# Patient Record
Sex: FEMALE | Race: WHITE | NOT HISPANIC OR LATINO
[De-identification: names, ages, dates, MRNs, and addresses within clinical notes are randomized per-mention and may not be internally consistent; named-entity substitution may affect disease eponyms.]

---

## 2018-06-08 ENCOUNTER — APPOINTMENT (OUTPATIENT)
Dept: PLASTIC SURGERY | Facility: CLINIC | Age: 78
End: 2018-06-08
Payer: SELF-PAY

## 2018-06-08 PROBLEM — Z00.00 ENCOUNTER FOR PREVENTIVE HEALTH EXAMINATION: Status: ACTIVE | Noted: 2018-06-08

## 2018-06-08 PROCEDURE — 11950 SUBQ NJX FILLING MATRL 1CC/<: CPT

## 2018-06-08 RX ORDER — BESIFLOXACIN 6 MG/ML
0.6 SUSPENSION OPHTHALMIC
Qty: 5 | Refills: 0 | Status: ACTIVE | COMMUNITY
Start: 2017-12-20

## 2018-06-08 RX ORDER — DIFLUPREDNATE 0.5 MG/ML
0.05 EMULSION OPHTHALMIC
Qty: 5 | Refills: 0 | Status: ACTIVE | COMMUNITY
Start: 2017-12-20

## 2018-06-08 RX ORDER — CONJUGATED ESTROGENS AND MEDROXYPROGESTERONE ACETATE .625; 2.5 MG/1; MG/1
0.625-2.5 TABLET, SUGAR COATED ORAL
Qty: 28 | Refills: 0 | Status: ACTIVE | COMMUNITY
Start: 2018-05-10

## 2018-06-08 RX ORDER — NEPAFENAC 3 MG/ML
0.3 SUSPENSION/ DROPS OPHTHALMIC
Qty: 17 | Refills: 0 | Status: ACTIVE | COMMUNITY
Start: 2017-12-20

## 2018-08-02 ENCOUNTER — APPOINTMENT (OUTPATIENT)
Dept: PLASTIC SURGERY | Facility: CLINIC | Age: 78
End: 2018-08-02

## 2018-08-15 ENCOUNTER — APPOINTMENT (OUTPATIENT)
Dept: PLASTIC SURGERY | Facility: CLINIC | Age: 78
End: 2018-08-15
Payer: SELF-PAY

## 2018-08-15 PROCEDURE — 11950 SUBQ NJX FILLING MATRL 1CC/<: CPT

## 2019-06-05 ENCOUNTER — APPOINTMENT (OUTPATIENT)
Dept: PLASTIC SURGERY | Facility: CLINIC | Age: 79
End: 2019-06-05
Payer: SELF-PAY

## 2019-06-05 PROCEDURE — 11950 SUBQ NJX FILLING MATRL 1CC/<: CPT

## 2019-06-05 NOTE — PROCEDURE
[FreeTextEntry2] : Restylane lift injection, 1 syringe  [FreeTextEntry1] : Prominent nasolabial folds and marionette lines  [FreeTextEntry3] : none  [FreeTextEntry4] : none  [FreeTextEntry5] : none  [FreeTextEntry6] : Patient had prominent nasolabial folds and marionette lines  treated with 1 syringe of Restylane lift.\par Tolerated well  [FreeTextEntry7] : none

## 2019-06-05 NOTE — REASON FOR VISIT
[Procedure: _________] : a [unfilled] procedure visit [FreeTextEntry1] : Patient returns for treatment of prominent nasolabial folds and marionette lines with Restylane lift

## 2019-06-05 NOTE — ASSESSMENT
[FreeTextEntry1] : A:\par Patient with prominent nasolabial folds and marionette lines \par P:\par Restylane lift 1 syringe injected\par Tolerated well\par Instructions reviewed

## 2019-11-07 ENCOUNTER — APPOINTMENT (OUTPATIENT)
Dept: PLASTIC SURGERY | Facility: CLINIC | Age: 79
End: 2019-11-07
Payer: SELF-PAY

## 2019-11-07 PROCEDURE — 11950 SUBQ NJX FILLING MATRL 1CC/<: CPT

## 2019-11-07 NOTE — REASON FOR VISIT
[Procedure: _________] : a [unfilled] procedure visit [FreeTextEntry1] : Patient returns for treatment of nasolabial folds and mid face volume loss with filler

## 2019-11-07 NOTE — ASSESSMENT
[FreeTextEntry1] : A:\par Prominent nasolabial folds and marionette lines\par B:\par Voluma to mid face\par Restylane to nasolabial folds and marionette lines \par Tolerated well\par Instructions reviewed

## 2019-11-07 NOTE — PROCEDURE
[FreeTextEntry1] : Prominent nasolabial folds and marionette lines [FreeTextEntry2] : Treatment of NLF with Restylane and midface with Voluma  [FreeTextEntry3] : none [FreeTextEntry4] : minimal  [FreeTextEntry5] : none  [FreeTextEntry6] : The patient underwent treatment of mid face volume loss with 1 syringe Voluma and prominent nasolabial folds and marionette liens with 1 syringe Restylane L.\par Tolerated well  [FreeTextEntry7] : none

## 2020-07-10 ENCOUNTER — APPOINTMENT (OUTPATIENT)
Dept: PLASTIC SURGERY | Facility: CLINIC | Age: 80
End: 2020-07-10

## 2020-08-07 ENCOUNTER — APPOINTMENT (OUTPATIENT)
Dept: PLASTIC SURGERY | Facility: CLINIC | Age: 80
End: 2020-08-07

## 2020-08-19 ENCOUNTER — APPOINTMENT (OUTPATIENT)
Dept: PLASTIC SURGERY | Facility: CLINIC | Age: 80
End: 2020-08-19
Payer: SELF-PAY

## 2020-08-19 PROCEDURE — 11950 SUBQ NJX FILLING MATRL 1CC/<: CPT | Mod: 59

## 2020-08-19 NOTE — PROCEDURE
[FreeTextEntry1] : Prominent nasolabial folds and Marionette lines with mid face volume loss  [FreeTextEntry2] : Voluma and Restylane lift injection  [FreeTextEntry3] : none  [FreeTextEntry6] : THe mid face volume deficit was corrected with 1 syringe Voluma, and then 1 syringe Restylane lift was injected in the nasolabial folds and marionette lines. \par Patient tolerated well  [FreeTextEntry4] : minimal  [FreeTextEntry5] : none  [FreeTextEntry7] : none

## 2020-08-19 NOTE — ASSESSMENT
[FreeTextEntry1] : A:\par Prominent nasolabial folds and marionette lines with mid face volume loss\par P:\par Injection with Volume and Restylane lift\par Patient tolerated well\par Instructions reviewed

## 2020-08-19 NOTE — REASON FOR VISIT
[Procedure: _________] : a [unfilled] procedure visit [FreeTextEntry1] : Patient presents for treatment of facial rhytids with Voluma and Restylane silk

## 2020-10-30 ENCOUNTER — APPOINTMENT (OUTPATIENT)
Dept: PLASTIC SURGERY | Facility: CLINIC | Age: 80
End: 2020-10-30
Payer: SELF-PAY

## 2020-10-30 PROCEDURE — 11950 SUBQ NJX FILLING MATRL 1CC/<: CPT | Mod: 59

## 2020-10-30 NOTE — REASON FOR VISIT
[Procedure: _________] : a [unfilled] procedure visit [FreeTextEntry1] : Patient returns for mid face augmentation and treatment of nasolabial folds and marionette lines with filler

## 2020-10-30 NOTE — ASSESSMENT
[FreeTextEntry1] : A:\par Voluma to midface and Restylane to nasolabial folds and marionette lines \par P:\par Voluma to mid face\par Restylane to nasolabial folds and marionette lines \par Tolerated well\par Instructions reviewed

## 2020-10-30 NOTE — PROCEDURE
[FreeTextEntry1] : Mid face volume loss and prominent nasolabial folds and marionette lines  [FreeTextEntry2] : Voluma to midface and Restylane to nasolabial folds and marionette lines  [FreeTextEntry3] : none  [FreeTextEntry4] : minimal  [FreeTextEntry5] : none  [FreeTextEntry6] : The mid face was injected with one syringe Voluma and the nasolabial folds and marionette lines with 1 syringe Restylane silk\par Patient tolerated well  [FreeTextEntry7] : none

## 2021-06-16 ENCOUNTER — APPOINTMENT (OUTPATIENT)
Dept: PLASTIC SURGERY | Facility: CLINIC | Age: 81
End: 2021-06-16
Payer: SELF-PAY

## 2021-06-16 PROCEDURE — 11950 SUBQ NJX FILLING MATRL 1CC/<: CPT | Mod: 59

## 2021-06-16 PROCEDURE — 11951 SUBQ NJX FIL MATRL 1.1-5.0CC: CPT

## 2021-06-16 NOTE — ASSESSMENT
[FreeTextEntry1] : A:\par Ivette oral rhytids and midface volume loss\par P:\par Restylane to nasolabial folds and marionette lines and Voluma to midface \par Tolerated well\par Instructions reviewed

## 2021-06-16 NOTE — REASON FOR VISIT
[Procedure: _________] : a [unfilled] procedure visit [FreeTextEntry1] : Patient with perii oral rhytids and mid face volume loss for Restylane and Voluma

## 2021-06-16 NOTE — PROCEDURE
[FreeTextEntry1] : Ivette oral rhytids and mid face volume loss  [FreeTextEntry2] : Restylane to nasolabial folds and marionette lines and Voluma to midface  [FreeTextEntry3] : none  [FreeTextEntry4] : minimal  [FreeTextEntry5] : none  [FreeTextEntry6] : Patient received one syringe Restylane silk to the nasolabial folds and marionette lines and one syringe Voluma to the midface area.  \par Tolerated well  [FreeTextEntry7] : none

## 2021-10-11 ENCOUNTER — APPOINTMENT (OUTPATIENT)
Dept: PLASTIC SURGERY | Facility: CLINIC | Age: 81
End: 2021-10-11
Payer: SELF-PAY

## 2021-10-11 PROCEDURE — 11950 SUBQ NJX FILLING MATRL 1CC/<: CPT

## 2021-10-11 NOTE — ASSESSMENT
[FreeTextEntry1] : A:\par Facial rhytids\par P:\par 1 syringe Voluma to the mid face\par 1 syringe Restylane silk to the nasolabial folds and marionette lines\par Tolerated well\par Instructions reviewed

## 2021-10-11 NOTE — PROCEDURE
[FreeTextEntry1] : Facial rhytids [FreeTextEntry2] : Voluma to midface and Restylane silk to nasolabial folds and marionette lines  [FreeTextEntry3] : none  [FreeTextEntry4] : minimal  [FreeTextEntry5] : none  [FreeTextEntry6] : Following a sterile prep, patient received injection of 1 syringe of Voluma to the midface, and 1 syringe Restylane silk to the nasolabial folds and marionette lines [FreeTextEntry7] : none

## 2021-10-11 NOTE — REASON FOR VISIT
[Procedure: _________] : a [unfilled] procedure visit [FreeTextEntry1] : Patient returns for treatment of facial rhytids with fillers

## 2022-06-29 ENCOUNTER — APPOINTMENT (OUTPATIENT)
Dept: PLASTIC SURGERY | Facility: CLINIC | Age: 82
End: 2022-06-29
Payer: SELF-PAY

## 2022-06-29 PROCEDURE — 11950 SUBQ NJX FILLING MATRL 1CC/<: CPT

## 2022-06-29 NOTE — PROCEDURE
[FreeTextEntry1] : Ivette oral rhytids with prominent nasolabial folds and marionette lines  [FreeTextEntry2] : Voluma to mid face and Restylane to NLF and marionette lines  [FreeTextEntry3] : none  [FreeTextEntry4] : minimal  [FreeTextEntry5] : none  [FreeTextEntry6] : Patient received one syringe Voluma to mid face and 1 syringe Restylane to nasolabial folds and marionette lines\par Tolerated well  [FreeTextEntry7] : none

## 2022-06-29 NOTE — ASSESSMENT
[FreeTextEntry1] : A:\par Ivette oral rhytids with prominent nasolabial folds and marionette lines\par P:\par Voluma times 1 to mid face\par Restylane times 1 to nasolabial folds and marionette lines \par Tolerated well \par Instructions reviewed

## 2022-06-29 NOTE — REASON FOR VISIT
[Procedure: _________] : a [unfilled] procedure visit [FreeTextEntry1] : Patient returns for mid face rejuvenation with fillers

## 2022-10-12 ENCOUNTER — APPOINTMENT (OUTPATIENT)
Dept: PLASTIC SURGERY | Facility: CLINIC | Age: 82
End: 2022-10-12

## 2022-10-12 VITALS
TEMPERATURE: 98.1 F | RESPIRATION RATE: 20 BRPM | DIASTOLIC BLOOD PRESSURE: 94 MMHG | OXYGEN SATURATION: 95 % | HEART RATE: 112 BPM | SYSTOLIC BLOOD PRESSURE: 138 MMHG

## 2022-10-12 DIAGNOSIS — Z86.79 PERSONAL HISTORY OF OTHER DISEASES OF THE CIRCULATORY SYSTEM: ICD-10-CM

## 2022-10-12 PROCEDURE — 11950 SUBQ NJX FILLING MATRL 1CC/<: CPT

## 2022-10-12 RX ORDER — ASPIRIN 81 MG/1
81 TABLET, COATED ORAL
Refills: 0 | Status: ACTIVE | COMMUNITY

## 2022-10-12 RX ORDER — APIXABAN 2.5 MG/1
2.5 TABLET, FILM COATED ORAL
Refills: 0 | Status: ACTIVE | COMMUNITY

## 2022-10-12 NOTE — PROCEDURE
[FreeTextEntry1] : Ivette oral rhytids [FreeTextEntry2] : Restylane and Juvaderm injection  [FreeTextEntry3] : none  [FreeTextEntry4] : minimal [FreeTextEntry5] : none  [FreeTextEntry6] : Patient treated with Juvaderm to nasolabial folds, and Restylane L to marionette lines, pre mental rhytids and jawline\par Tolerated well  [FreeTextEntry7] : none

## 2022-10-12 NOTE — REASON FOR VISIT
[Procedure: _________] : a [unfilled] procedure visit [FreeTextEntry1] : Patient presents for rodrigo oral rejuvenation with filler

## 2022-10-12 NOTE — ASSESSMENT
[FreeTextEntry1] : A:\par Ivette oral rhytids\par P:\par Juvaderm 1 syringe\par Restylane L 1 syringe\par Tolerated well \par Instructions reviewed

## 2023-06-15 ENCOUNTER — APPOINTMENT (OUTPATIENT)
Dept: PLASTIC SURGERY | Facility: CLINIC | Age: 83
End: 2023-06-15
Payer: SELF-PAY

## 2023-06-15 PROCEDURE — 11950 SUBQ NJX FILLING MATRL 1CC/<: CPT

## 2023-06-15 NOTE — ASSESSMENT
[FreeTextEntry1] : A:\par Ivette oral rhytids\par P;\par Juvederm and Restylane injections, 1 syringe of each \par Tolerated well \par Instructions reviewed

## 2023-06-15 NOTE — REASON FOR VISIT
[Procedure: _________] : a [unfilled] procedure visit [FreeTextEntry1] : Patient returns for treatment of rodrigo oral rhytids with filler

## 2023-06-15 NOTE — PROCEDURE
[FreeTextEntry1] : Ivette oral rhytids [FreeTextEntry2] : Filler injection rodrigo oral area  [FreeTextEntry3] : none  [FreeTextEntry4] : minimal  [FreeTextEntry5] : none  [FreeTextEntry6] : Patient treated in nasolabial folds with 1 syringe Juvederm, and in rodrigo oral area with 1 syringe Restylane.\par Tolerated well  [FreeTextEntry7] : none

## 2023-10-26 ENCOUNTER — APPOINTMENT (RX ONLY)
Dept: URBAN - METROPOLITAN AREA CLINIC 102 | Facility: CLINIC | Age: 83
Setting detail: DERMATOLOGY
End: 2023-10-26

## 2023-10-26 DIAGNOSIS — Z41.9 ENCOUNTER FOR PROCEDURE FOR PURPOSES OTHER THAN REMEDYING HEALTH STATE, UNSPECIFIED: ICD-10-CM

## 2023-10-26 PROCEDURE — ? COSMETIC CONSULTATION: FILLERS

## 2023-10-26 ASSESSMENT — LOCATION DETAILED DESCRIPTION DERM: LOCATION DETAILED: RIGHT INFERIOR MEDIAL MALAR CHEEK

## 2023-10-26 ASSESSMENT — LOCATION SIMPLE DESCRIPTION DERM: LOCATION SIMPLE: RIGHT CHEEK

## 2023-10-26 ASSESSMENT — LOCATION ZONE DERM: LOCATION ZONE: FACE

## 2023-12-01 ENCOUNTER — APPOINTMENT (RX ONLY)
Dept: URBAN - METROPOLITAN AREA CLINIC 102 | Facility: CLINIC | Age: 83
Setting detail: DERMATOLOGY
End: 2023-12-01

## 2023-12-01 DIAGNOSIS — Z41.9 ENCOUNTER FOR PROCEDURE FOR PURPOSES OTHER THAN REMEDYING HEALTH STATE, UNSPECIFIED: ICD-10-CM

## 2023-12-01 PROCEDURE — ? FILLERS

## 2023-12-01 ASSESSMENT — LOCATION ZONE DERM: LOCATION ZONE: FACE

## 2023-12-01 ASSESSMENT — LOCATION DETAILED DESCRIPTION DERM: LOCATION DETAILED: RIGHT INFERIOR MEDIAL MALAR CHEEK

## 2023-12-01 ASSESSMENT — LOCATION SIMPLE DESCRIPTION DERM: LOCATION SIMPLE: RIGHT CHEEK

## 2024-03-18 ENCOUNTER — APPOINTMENT (RX ONLY)
Dept: URBAN - METROPOLITAN AREA CLINIC 102 | Facility: CLINIC | Age: 84
Setting detail: DERMATOLOGY
End: 2024-03-18

## 2024-03-18 DIAGNOSIS — L65.0 TELOGEN EFFLUVIUM: ICD-10-CM | Status: INADEQUATELY CONTROLLED

## 2024-03-18 PROCEDURE — 99214 OFFICE O/P EST MOD 30 MIN: CPT

## 2024-03-18 PROCEDURE — ? ADDITIONAL NOTES

## 2024-03-18 PROCEDURE — ? COUNSELING

## 2024-03-18 PROCEDURE — ? PRESCRIPTION

## 2024-03-18 RX ORDER — FINASTERIDE 5 MG/1
TABLET, FILM COATED ORAL
Qty: 90 | Refills: 3 | Status: ERX | COMMUNITY
Start: 2024-03-18

## 2024-03-18 RX ORDER — MINOXIDIL 2.5 MG/1
TABLET ORAL
Qty: 90 | Refills: 3 | Status: ERX | COMMUNITY
Start: 2024-03-18

## 2024-03-18 RX ADMIN — FINASTERIDE: 5 TABLET, FILM COATED ORAL at 00:00

## 2024-03-18 RX ADMIN — MINOXIDIL: 2.5 TABLET ORAL at 00:00

## 2024-03-18 ASSESSMENT — LOCATION DETAILED DESCRIPTION DERM: LOCATION DETAILED: MID-FRONTAL SCALP

## 2024-03-18 ASSESSMENT — LOCATION ZONE DERM: LOCATION ZONE: SCALP

## 2024-03-18 ASSESSMENT — LOCATION SIMPLE DESCRIPTION DERM: LOCATION SIMPLE: ANTERIOR SCALP

## 2024-03-18 NOTE — PROCEDURE: ADDITIONAL NOTES
Render Risk Assessment In Note?: no
Detail Level: Simple
Additional Notes: Pt had pneumonia and covid in Dec 2023. She had a really high temperature.

## 2024-06-18 ENCOUNTER — APPOINTMENT (OUTPATIENT)
Dept: PLASTIC SURGERY | Facility: CLINIC | Age: 84
End: 2024-06-18
Payer: SELF-PAY

## 2024-06-18 DIAGNOSIS — Z41.1 ENCOUNTER FOR COSMETIC SURGERY: ICD-10-CM

## 2024-06-18 PROCEDURE — 11950 SUBQ NJX FILLING MATRL 1CC/<: CPT | Mod: 59

## 2024-06-18 NOTE — ASSESSMENT
[FreeTextEntry1] : A: Midface volume loss and prominent nasolabial folds and marionette lines. P: Voluma to mid face, and Restylane to nasolabial folds and marionette lines.   Tolerated well, instructions reviewed.

## 2024-06-18 NOTE — REASON FOR VISIT
[Procedure: _________] : a [unfilled] procedure visit [FreeTextEntry1] : Patient returns for treatment of mid face volume loss and prominent nasolabial folds and marionette lines with Voluma and Restylane

## 2024-06-18 NOTE — PROCEDURE
[FreeTextEntry1] : Mid face volume loss and prominent nasolabial folds and marionette lines [FreeTextEntry2] : Treatment of mid face volume loss and prominent nasolabial folds and marionette lines [FreeTextEntry3] : Topical  [FreeTextEntry4] : minimal  [FreeTextEntry5] : none  [FreeTextEntry6] : Following pretreatment with [employer], 1 syringe Voluma injected in the midface, and 1 syringe Restylane injected in the nasolabial folds and marionette lines.   Tolerated well, instructions reviewed.
